# Patient Record
Sex: MALE | Race: WHITE | Employment: OTHER | URBAN - METROPOLITAN AREA
[De-identification: names, ages, dates, MRNs, and addresses within clinical notes are randomized per-mention and may not be internally consistent; named-entity substitution may affect disease eponyms.]

---

## 2020-10-27 ENCOUNTER — HOSPITAL ENCOUNTER (EMERGENCY)
Facility: CLINIC | Age: 40
Discharge: HOME | End: 2020-10-27
Attending: NURSE PRACTITIONER | Admitting: NURSE PRACTITIONER

## 2020-10-27 ENCOUNTER — APPOINTMENT (OUTPATIENT)
Dept: GENERAL RADIOLOGY | Facility: CLINIC | Age: 40
End: 2020-10-27
Attending: NURSE PRACTITIONER

## 2020-10-27 VITALS
BODY MASS INDEX: 31.5 KG/M2 | HEART RATE: 104 BPM | WEIGHT: 220 LBS | TEMPERATURE: 98.4 F | DIASTOLIC BLOOD PRESSURE: 96 MMHG | RESPIRATION RATE: 18 BRPM | OXYGEN SATURATION: 96 % | SYSTOLIC BLOOD PRESSURE: 150 MMHG | HEIGHT: 70 IN

## 2020-10-27 DIAGNOSIS — S61.219A LACERATION OF FINGER: ICD-10-CM

## 2020-10-27 DIAGNOSIS — S61.309A NAIL AVULSION, FINGER: ICD-10-CM

## 2020-10-27 PROCEDURE — 73140 X-RAY EXAM OF FINGER(S): CPT | Mod: LT

## 2020-10-27 PROCEDURE — 90715 TDAP VACCINE 7 YRS/> IM: CPT | Performed by: NURSE PRACTITIONER

## 2020-10-27 PROCEDURE — G0463 HOSPITAL OUTPT CLINIC VISIT: HCPCS | Mod: 25 | Performed by: NURSE PRACTITIONER

## 2020-10-27 PROCEDURE — 12001 RPR S/N/AX/GEN/TRNK 2.5CM/<: CPT | Mod: 59 | Performed by: NURSE PRACTITIONER

## 2020-10-27 PROCEDURE — 250N000011 HC RX IP 250 OP 636: Performed by: NURSE PRACTITIONER

## 2020-10-27 PROCEDURE — 11730 AVULSION NAIL PLATE SIMPLE 1: CPT | Performed by: NURSE PRACTITIONER

## 2020-10-27 PROCEDURE — 99203 OFFICE O/P NEW LOW 30 MIN: CPT | Mod: 25 | Performed by: NURSE PRACTITIONER

## 2020-10-27 PROCEDURE — 12001 RPR S/N/AX/GEN/TRNK 2.5CM/<: CPT | Performed by: NURSE PRACTITIONER

## 2020-10-27 PROCEDURE — 90471 IMMUNIZATION ADMIN: CPT | Performed by: NURSE PRACTITIONER

## 2020-10-27 RX ORDER — OXYCODONE AND ACETAMINOPHEN 5; 325 MG/1; MG/1
1-2 TABLET ORAL EVERY 6 HOURS PRN
Qty: 8 TABLET | Refills: 0 | Status: SHIPPED | OUTPATIENT
Start: 2020-10-27

## 2020-10-27 RX ADMIN — CLOSTRIDIUM TETANI TOXOID ANTIGEN (FORMALDEHYDE INACTIVATED), CORYNEBACTERIUM DIPHTHERIAE TOXOID ANTIGEN (FORMALDEHYDE INACTIVATED), BORDETELLA PERTUSSIS TOXOID ANTIGEN (GLUTARALDEHYDE INACTIVATED), BORDETELLA PERTUSSIS FILAMENTOUS HEMAGGLUTININ ANTIGEN (FORMALDEHYDE INACTIVATED), BORDETELLA PERTUSSIS PERTACTIN ANTIGEN, AND BORDETELLA PERTUSSIS FIMBRIAE 2/3 ANTIGEN 0.5 ML: 5; 2; 2.5; 5; 3; 5 INJECTION, SUSPENSION INTRAMUSCULAR at 18:02

## 2020-10-27 ASSESSMENT — ENCOUNTER SYMPTOMS
MYALGIAS: 1
NUMBNESS: 0
ARTHRALGIAS: 1
WEAKNESS: 0
WOUND: 1

## 2020-10-27 ASSESSMENT — MIFFLIN-ST. JEOR: SCORE: 1924.16

## 2020-10-27 NOTE — ED PROVIDER NOTES
"  History     Chief Complaint   Patient presents with     Laceration     crush injury to left little finger      HPI  Rigoberto Flores is a 40 year old male who presents the urgent care for evaluation of left fifth finger injury.  About an hour prior to arrival patient got the distal tip of his left fifth finger crushed between 2 pieces of steel.  Denies numbness or tingling.  Reports full mobility of the fingers and left hand.  Bleeding controlled prior to arrival.  Unknown last tetanus.    Allergies:  No Known Allergies    Problem List:    There are no active problems to display for this patient.       Past Medical History:    No past medical history on file.    Past Surgical History:    No past surgical history on file.    Family History:    No family history on file.    Social History:  Marital Status:   [2]  Social History     Tobacco Use     Smoking status: Not on file   Substance Use Topics     Alcohol use: Not on file     Drug use: Not on file      Medications:         oxyCODONE-acetaminophen (PERCOCET) 5-325 MG tablet      Review of Systems   Musculoskeletal: Positive for arthralgias and myalgias.   Skin: Positive for wound.   Neurological: Negative for weakness and numbness.   All other systems reviewed and are negative.    Physical Exam   BP: (!) 150/96  Pulse: 104  Temp: 98.4  F (36.9  C)  Resp: 18  Height: 177.8 cm (5' 10\")  Weight: 99.8 kg (220 lb)  SpO2: 96 %    Physical Exam  Constitutional:       General: He is not in acute distress.     Appearance: Normal appearance.   Cardiovascular:      Rate and Rhythm: Tachycardia present.   Pulmonary:      Breath sounds: Normal breath sounds.   Musculoskeletal: Normal range of motion.      Comments: Avulsion of the left 5th finger nail with surrounding laceration. Perfusion equal bilaterally.    Skin:     General: Skin is warm.      Capillary Refill: Capillary refill takes less than 2 seconds.   Neurological:      General: No focal deficit present. "      Mental Status: He is alert.   Psychiatric:         Mood and Affect: Mood normal.       ED Course        [facility]     -Laceration Repair    Date/Time: 10/27/2020 6:00 PM  Performed by: Diann Jimenes APRN CNP  Authorized by: Diann Jimenes APRN CNP       ANESTHESIA (see MAR for exact dosages):     Anesthesia method:  Nerve block    Block needle gauge:  30 G    Block anesthetic:  Lidocaine 1% w/o epi    Block injection procedure:  Anatomic landmarks identified, introduced needle, incremental injection, anatomic landmarks palpated and negative aspiration for blood    Block outcome:  Anesthesia achieved      LACERATION DETAILS     Location:  Finger    Finger location:  L small finger    Length (cm):  1.2  EXPLORATION:     Hemostasis achieved with:  Tourniquet    Wound exploration: wound explored through full range of motion and entire depth of wound probed and visualized      Wound extent: no tendon damage and no underlying fracture      TREATMENT:     Area cleansed with:  Betadine, Hibiclens and saline    Amount of cleaning:  Standard    Irrigation solution:  Sterile saline    Irrigation method:  Syringe    SKIN REPAIR     Repair method:  Sutures    Suture size:  4-0    Suture material:  Nylon    Suture technique:  Simple interrupted    Number of sutures:  5    APPROXIMATION     Approximation:  Close    POST-PROCEDURE DETAILS     Dressing:  Tube gauze and antibiotic ointment      PROCEDURE   Patient Tolerance:  Patient tolerated the procedure well with no immediate complications    [facility]     Nail Removal    Date/Time: 10/27/2020 6:02 PM  Performed by: Diann Jimenes APRN CNP  Authorized by: Diann Jimenes APRN CNP       LOCATION:     Hand:  L small finger  PRE-PROCEDURE DETAILS:     Skin preparation:  Betadine and Hibiclens  ANESTHESIA (see MAR for exact dosages):     Anesthesia method:  Nerve block    Block needle gauge:  30 G    Block anesthetic:   Lidocaine 1% w/o epi    Block injection procedure:  Anatomic landmarks identified, anatomic landmarks palpated, negative aspiration for blood, introduced needle and incremental injection    Block outcome:  Anesthesia achieved  NAIL REMOVAL:     Nail removed:  Complete  POST-PROCEDURE DETAILS:     Dressing:  Antibiotic ointment and tube gauze    Patient tolerance of procedure:  Patient tolerated the procedure well with no immediate complications      PROCEDURE   Patient Tolerance:  Patient tolerated the procedure well with no immediate complications        Results for orders placed or performed during the hospital encounter of 10/27/20 (from the past 24 hour(s))   -Laceration Repair    Narrative    Diann Jimenes APRN CNP     10/27/2020  6:19 PM  [facility]     -Laceration Repair    Date/Time: 10/27/2020 6:00 PM  Performed by: Diann Jimenes APRN CNP  Authorized by: Diann Jimenes APRN CNP       ANESTHESIA (see MAR for exact dosages):     Anesthesia method:  Nerve block    Block needle gauge:  30 G    Block anesthetic:  Lidocaine 1% w/o epi    Block injection procedure:  Anatomic landmarks identified, introduced   needle, incremental injection, anatomic landmarks palpated and negative   aspiration for blood    Block outcome:  Anesthesia achieved      LACERATION DETAILS     Location:  Finger    Finger location:  L small finger    Length (cm):  1.2  EXPLORATION:     Hemostasis achieved with:  Tourniquet    Wound exploration: wound explored through full range of motion and   entire depth of wound probed and visualized      Wound extent: no tendon damage and no underlying fracture      TREATMENT:     Area cleansed with:  Betadine, Hibiclens and saline    Amount of cleaning:  Standard    Irrigation solution:  Sterile saline    Irrigation method:  Syringe    SKIN REPAIR     Repair method:  Sutures    Suture size:  4-0    Suture material:  Nylon    Suture technique:  Simple interrupted     Number of sutures:  5    APPROXIMATION     Approximation:  Close    POST-PROCEDURE DETAILS     Dressing:  Tube gauze and antibiotic ointment      PROCEDURE   Patient Tolerance:  Patient tolerated the procedure well with no immediate   complications     Nail Removal    Narrative    Diann Jimenes APRN CNP     10/27/2020  6:19 PM  [facility]     Nail Removal    Date/Time: 10/27/2020 6:02 PM  Performed by: Diann Jimenes APRN CNP  Authorized by: Diann Jimenes APRN CNP       LOCATION:     Hand:  L small finger  PRE-PROCEDURE DETAILS:     Skin preparation:  Betadine and Hibiclens  ANESTHESIA (see MAR for exact dosages):     Anesthesia method:  Nerve block    Block needle gauge:  30 G    Block anesthetic:  Lidocaine 1% w/o epi    Block injection procedure:  Anatomic landmarks identified, anatomic   landmarks palpated, negative aspiration for blood, introduced needle and   incremental injection    Block outcome:  Anesthesia achieved  NAIL REMOVAL:     Nail removed:  Complete  POST-PROCEDURE DETAILS:     Dressing:  Antibiotic ointment and tube gauze    Patient tolerance of procedure:  Patient tolerated the procedure well   with no immediate complications      PROCEDURE   Patient Tolerance:  Patient tolerated the procedure well with no immediate   complications     Fingers XR, 2-3 views, left    Narrative    EXAM: XR FINGER LT G/E 2 VW  LOCATION: [facility]  DATE/TIME: 10/27/2020 5:24 PM    INDICATION: Left fifth finger pain after a crushing injury.  COMPARISON: None.      Impression    IMPRESSION:   1.  No fracture or joint malalignment.  2.  Soft tissue irregularity-injury in the distal aspect of the fifth finger.         Medications   Tdap (tetanus-diphtheria-acell pertussis) (ADACEL) injection 0.5 mL (0.5 mLs Intramuscular Given 10/27/20 1802)     Assessments & Plan (with Medical Decision Making)   Rigoberto SHEPPARD Davidapril is a 40 year old male who presents the urgent care for  evaluation of left fifth finger injury.  About an hour prior to arrival patient got the distal tip of his left fifth finger crushed between 2 pieces of steel. Exam as above. Xray obtained with no acute fracture or malalignment.  See procedure note above for 5 sutures placed.  Fingernail is entirely avulsed, complete removal done.  No sutures needed to the underlying nail bed.  Surgicel placed along with tube gauze.  Educated on home wound care and worrisome reasons to seek reevaluation sooner than the next 7 days when he should get the sutures removed.  Tylenol and ibuprofen as needed for pain, Percocet for breakthrough moderate to severe pain.  Educated on narcotic use.  Patient is agreeable to plan of care, all questions answered.  Tetanus updated.  Patient discharged in no acute distress.  I have reviewed the nursing notes.    I have reviewed the findings, diagnosis, plan and need for follow up with the patient.  Discharge Medication List as of 10/27/2020  5:59 PM        Final diagnoses:   Nail avulsion, finger - left 5th nail   Laceration of finger     10/27/2020   [facility] EMERGENCY DEPT     Diann Jimenes, MARY JO CNP  10/27/20 1819       Diann Jimenes, MARY JO CNP  10/27/20 1824

## 2020-10-27 NOTE — ED AVS SNAPSHOT
[facility] Emergency Dept  [address] [address]  Phone: [phone]  Fax: [phone]                                    Rigoberto Flores   MRN: [id number]    Department: [facility] Emergency Dept   Date of Visit: 10/27/2020           After Visit Summary Signature Page    I have received my discharge instructions, and my questions have been answered. I have discussed any challenges I see with this plan with the nurse or doctor.    ..........................................................................................................................................  Patient/Patient Representative Signature      ..........................................................................................................................................  Patient Representative Print Name and Relationship to Patient    ..................................................               ................................................  Date                                   Time    ..........................................................................................................................................  Reviewed by Signature/Title    ...................................................              ..............................................  Date                                               Time          22EPIC Rev 08/18